# Patient Record
Sex: FEMALE | Race: WHITE | NOT HISPANIC OR LATINO | Employment: FULL TIME | URBAN - METROPOLITAN AREA
[De-identification: names, ages, dates, MRNs, and addresses within clinical notes are randomized per-mention and may not be internally consistent; named-entity substitution may affect disease eponyms.]

---

## 2021-05-28 ENCOUNTER — RECORDS - HEALTHEAST (OUTPATIENT)
Dept: ADMINISTRATIVE | Facility: CLINIC | Age: 51
End: 2021-05-28

## 2021-06-27 ENCOUNTER — HEALTH MAINTENANCE LETTER (OUTPATIENT)
Age: 51
End: 2021-06-27

## 2021-10-14 ENCOUNTER — APPOINTMENT (OUTPATIENT)
Dept: RADIOLOGY | Facility: CLINIC | Age: 51
End: 2021-10-14
Payer: OTHER MISCELLANEOUS

## 2021-10-14 ENCOUNTER — HOSPITAL ENCOUNTER (EMERGENCY)
Facility: CLINIC | Age: 51
Discharge: HOME | End: 2021-10-14
Admitting: PHYSICIAN ASSISTANT
Payer: OTHER MISCELLANEOUS

## 2021-10-14 VITALS
RESPIRATION RATE: 12 BRPM | TEMPERATURE: 98.5 F | OXYGEN SATURATION: 99 % | DIASTOLIC BLOOD PRESSURE: 86 MMHG | SYSTOLIC BLOOD PRESSURE: 122 MMHG | HEART RATE: 77 BPM

## 2021-10-14 DIAGNOSIS — S61.011A LACERATION OF RIGHT THUMB WITHOUT FOREIGN BODY WITHOUT DAMAGE TO NAIL, INITIAL ENCOUNTER: ICD-10-CM

## 2021-10-14 PROCEDURE — 73140 X-RAY EXAM OF FINGER(S): CPT | Mod: RT

## 2021-10-14 PROCEDURE — 272N000047 HC ADHESIVE DERMABOND SKIN

## 2021-10-14 PROCEDURE — 99283 EMERGENCY DEPT VISIT LOW MDM: CPT

## 2021-10-14 PROCEDURE — 12001 RPR S/N/AX/GEN/TRNK 2.5CM/<: CPT

## 2021-10-14 ASSESSMENT — ENCOUNTER SYMPTOMS
SHORTNESS OF BREATH: 0
WEAKNESS: 0
COUGH: 0
CHILLS: 0
NUMBNESS: 0
WOUND: 1
FEVER: 0

## 2021-10-14 NOTE — ED PROVIDER NOTES
EMERGENCY DEPARTMENT ENCOUNTER      NAME: Camila Rizvi  AGE: 51 year old female  YOB: 1970  MRN: [id number]  EVALUATION DATE & TIME: 10/14/2021  5:46 PM    PCP: No primary care provider on file.    ED PROVIDER: Nicolle Boggs PA-C      Chief Complaint   Patient presents with     Laceration         FINAL IMPRESSION:  1. Laceration of right thumb without foreign body without damage to nail, initial encounter          MEDICAL DECISION MAKING:    Pertinent Labs & Imaging studies reviewed. (See chart for details)  51 year old female presents to the Emergency Department for evaluation of right thumb laceration secondary to a clean lab knife. No numbness or tingling. Last tetanus 2018. Bleeding has stopped.    Vital signs within normal range. On exam, patient appears well and is in no distress. She has a 1.5cm superficial well approximated laceration to the volar mid right thumb without bleeding. Normal sensation and capillary refill distal to injury. Normal ROM of finger. Strong and equal radial pulses bilaterally. Given depth of laceration, will obtain plain film to rule out bony involvement or retained foreign body. No sign of neurovascular compromise. Patient declining analgesia.     XR finger negative for foreign body or bony injury.  Patient's laceration thoroughly irrigated.  I repaired this with Exofin surgical grade glue.  This approximated wound edges and controlled with minimal bleeding.  Patient placed in a thumb splint to help promote healing, as it does cross the joint.  No further emergent work-up or treatment indicated at this time.  I discussed wound cares with the patient and return precautions, including signs of infection.  Patient agreeable and discharged comfortable, ambulatory state.    ED COURSE  6:11 PM I met with the patient, obtained history, performed an initial exam, and discussed options and plan for diagnostics and treatment here in the ED.  6:59 PM I reevaluated and updated  patient. I performed laceration repair procedures without complications. I discussed plans for discharge. Patient was comfortable with plan.    At the conclusion of the encounter I discussed the results of all of the tests and the disposition. The questions were answered. The patient or family acknowledged understanding and was agreeable with the care plan.     MEDICATIONS GIVEN IN THE EMERGENCY:  Medications - No data to display    NEW PRESCRIPTIONS STARTED AT TODAY'S ER VISIT  New Prescriptions    No medications on file            =================================================================    HPI    Patient information was obtained from: patient    Use of Interpretor: N/A         Camila Rizvi is a 51 year old female who presents to this ED via walk in for evaluation of laceration.     Around 1500 today, patient was working at her pathology laboratory job, when she cut her right thumb on a clean blade. She presents with a deep laceration to her right thumb with associated pain and swelling. No associated numbness or changes to sensation. Denies use of blood thinners. Per EMR, her last tetanus vaccine was on 06/25/2018. No other medical concerns at this time.     REVIEW OF SYSTEMS   Review of Systems   Constitutional: Negative for chills and fever.   Respiratory: Negative for cough and shortness of breath.    Cardiovascular: Negative for chest pain.   Skin: Positive for wound (laceration to right thumb, with pain and swelling).   Neurological: Negative for weakness and numbness.   All other systems reviewed and are negative.       PAST MEDICAL HISTORY:  No past medical history on file.    PAST SURGICAL HISTORY:  No past surgical history on file.      CURRENT MEDICATIONS:    No current facility-administered medications for this encounter.  No current outpatient medications on file.      ALLERGIES:  No Known Allergies    FAMILY HISTORY:  No family history on file.    SOCIAL HISTORY:   Social History      Socioeconomic History     Marital status: Single     Spouse name: Not on file     Number of children: Not on file     Years of education: Not on file     Highest education level: Not on file   Occupational History     Not on file   Tobacco Use     Smoking status: Never Smoker   Substance and Sexual Activity     Alcohol use: Never     Drug use: Not on file     Sexual activity: Not on file   Other Topics Concern     Not on file   Social History Narrative     Not on file     Social Determinants of Health     Financial Resource Strain:      Difficulty of Paying Living Expenses:    Food Insecurity:      Worried About Running Out of Food in the Last Year:      Ran Out of Food in the Last Year:    Transportation Needs:      Lack of Transportation (Medical):      Lack of Transportation (Non-Medical):    Physical Activity:      Days of Exercise per Week:      Minutes of Exercise per Session:    Stress:      Feeling of Stress :    Social Connections:      Frequency of Communication with Friends and Family:      Frequency of Social Gatherings with Friends and Family:      Attends Church Services:      Active Member of Clubs or Organizations:      Attends Club or Organization Meetings:      Marital Status:    Intimate Partner Violence:      Fear of Current or Ex-Partner:      Emotionally Abused:      Physically Abused:      Sexually Abused:        VITALS:  Patient Vitals for the past 24 hrs:   BP Temp Temp src Pulse Resp SpO2   10/14/21 1742 122/86 98.5  F (36.9  C) Oral 77 12 99 %       PHYSICAL EXAM    General Appearance: Alert, cooperative, normal speech and facial symmetry, appears stated age, the patient does not appear in distress  Head:  Normocephalic, without obvious abnormality, atraumatic  Eyes: Conjunctiva/corneas clear, EOM's intact, no nystagmus, PERRL  ENT:  Lips, mucosa, and tongue normal  Cardio:  Regular rate and rhythm, S1 and S2 normal, no murmur, rub   or gallop, 2+ pulses symmetric in all  extremities  Pulm: Respirations unlabored with no accessory muscle use  Extremities:  Extremities normal, there is no tenderness to palpation , atraumatic, no cyanosis or edema, full function and range of motion, pulses equal in all extremities, normal cap refill, no joint swelling  Skin: 1.5cm superficial well approximated laceration to the volar mid right thumb without bleeding. Normal sensation and capillary refill distal to injury. Normal ROM of finger. Strong and equal radial pulses bilaterally. Skin color appears normal; no rashes or other lesions noted  Lymph:  Cervical, supraclavicular, and axillary nodes are non-tender  Neuro: Patient is awake, alert, and responsive to voice. No gross motor weaknesses or sensory loss; moves all extremities.      LAB:  All pertinent labs reviewed and interpreted.  Results for orders placed or performed during the hospital encounter of 10/14/21   XR Finger Right G/E 2 Views    Impression    IMPRESSION: No fracture or foreign body. No degenerative changes.         RADIOLOGY:  Reviewed all pertinent imaging. Please see official radiology report.  XR Finger Right G/E 2 Views   Final Result   IMPRESSION: No fracture or foreign body. No degenerative changes.             EKG:    None.     PROCEDURES:   PROCEDURE: Laceration Repair   INDICATIONS: Laceration   PROCEDURE PROVIDER:  Nicolle Boggs PA-C   SITE: Right thumb   TYPE/SIZE: simple, clean and no foreign body visualized  1.5 cm (total length)   FUNCTIONAL ASSESSMENT: Distal sensation, circulation and motor intact   PREPARATION: irrigation with Normal saline   DEBRIDEMENT: Wound explored, no foreign body identified   CLOSURE:  Wound was closed in   Dermabond (medical glue)             I, Sara Behmanesh, am serving as a scribe to document services personally performed by Nicolle Boggs PA-C based on my observation and the provider's statements to me. INicolle PA-C attest that Sara Behmanesh is acting in a scribe capacity,  has observed my performance of the services and has documented them in accordance with my direction.    Nicolle Boggs PA-C  Emergency Medicine  [facility]       Nicolle Boggs PA-C  10/14/21 1911

## 2021-10-15 NOTE — DISCHARGE INSTRUCTIONS
You were seen today in the ER for a thumb laceration.  Your X-ray was normal. We cleaned out your wound well and repaired it with a surgical-strength glue. This will fall off on its own after a week or two. Wear the thumb splint for the next few days to promote healing. Follow up with your primary care provider next week for a recheck of your wound as needed. Return to the ER for signs of infection or if you develop any new, concerning symptoms.

## 2021-10-17 ENCOUNTER — HEALTH MAINTENANCE LETTER (OUTPATIENT)
Age: 51
End: 2021-10-17

## 2022-04-03 ENCOUNTER — HEALTH MAINTENANCE LETTER (OUTPATIENT)
Age: 52
End: 2022-04-03

## 2022-07-24 ENCOUNTER — HEALTH MAINTENANCE LETTER (OUTPATIENT)
Age: 52
End: 2022-07-24

## 2022-10-02 ENCOUNTER — HEALTH MAINTENANCE LETTER (OUTPATIENT)
Age: 52
End: 2022-10-02

## 2023-05-20 ENCOUNTER — HEALTH MAINTENANCE LETTER (OUTPATIENT)
Age: 53
End: 2023-05-20

## 2023-08-12 ENCOUNTER — HEALTH MAINTENANCE LETTER (OUTPATIENT)
Age: 53
End: 2023-08-12

## 2024-10-05 ENCOUNTER — HEALTH MAINTENANCE LETTER (OUTPATIENT)
Age: 54
End: 2024-10-05